# Patient Record
(demographics unavailable — no encounter records)

---

## 2025-07-28 NOTE — HISTORY OF PRESENT ILLNESS
[de-identified] : Ciaran returns today for follow-up.  Since I saw him last he had one of the lumbar injections.  He has not started the physical therapy as yet.  Terms of his overall symptoms, not really having much in the way of back pain or leg pain.  He does have difficulty walking distances.  His legs feel heavy.  For a period of time he was having issues with loose stool.  He saw his gastroenterologist who did a colonoscopy and he was found to have colitis.

## 2025-07-28 NOTE — PHYSICAL EXAM
[de-identified] : Exam today is unchanged from previous.  No new weakness noted.  Still walks with a flexed forward posture.  His hip range of motion is painless bilaterally

## 2025-07-28 NOTE — ASSESSMENT
[FreeTextEntry1] : We had a lengthy talk today in the office.  He has a second injection scheduled for Wednesday I want him to complete that.  I want him to get started in physical therapy.  He is given a new referral today.  I will reassess him in 4 months time.  Based on his severe stenosis and his complaints I think at some point he is going to need to have at least a laminectomy possibly with a fusion.  We talked about that briefly today in the office.  Will talk about it again more when reevaluated in 4 weeks.  All questions addressed.